# Patient Record
Sex: FEMALE | Race: ASIAN | NOT HISPANIC OR LATINO | URBAN - METROPOLITAN AREA
[De-identification: names, ages, dates, MRNs, and addresses within clinical notes are randomized per-mention and may not be internally consistent; named-entity substitution may affect disease eponyms.]

---

## 2024-03-28 ENCOUNTER — EMERGENCY (EMERGENCY)
Facility: HOSPITAL | Age: 34
LOS: 0 days | Discharge: LEFT BEFORE DISCHARGE | End: 2024-03-28
Attending: STUDENT IN AN ORGANIZED HEALTH CARE EDUCATION/TRAINING PROGRAM
Payer: SELF-PAY

## 2024-03-28 VITALS
DIASTOLIC BLOOD PRESSURE: 91 MMHG | RESPIRATION RATE: 18 BRPM | TEMPERATURE: 98 F | WEIGHT: 167.77 LBS | OXYGEN SATURATION: 99 % | SYSTOLIC BLOOD PRESSURE: 142 MMHG | HEIGHT: 61 IN | HEART RATE: 70 BPM

## 2024-03-28 DIAGNOSIS — N93.9 ABNORMAL UTERINE AND VAGINAL BLEEDING, UNSPECIFIED: ICD-10-CM

## 2024-03-28 DIAGNOSIS — Z53.21 PROCEDURE AND TREATMENT NOT CARRIED OUT DUE TO PATIENT LEAVING PRIOR TO BEING SEEN BY HEALTH CARE PROVIDER: ICD-10-CM

## 2024-03-28 DIAGNOSIS — R10.32 LEFT LOWER QUADRANT PAIN: ICD-10-CM

## 2024-03-28 DIAGNOSIS — Z3A.01 LESS THAN 8 WEEKS GESTATION OF PREGNANCY: ICD-10-CM

## 2024-03-28 DIAGNOSIS — O99.891 OTHER SPECIFIED DISEASES AND CONDITIONS COMPLICATING PREGNANCY: ICD-10-CM

## 2024-03-28 DIAGNOSIS — O26.851 SPOTTING COMPLICATING PREGNANCY, FIRST TRIMESTER: ICD-10-CM

## 2024-03-28 PROCEDURE — 99285 EMERGENCY DEPT VISIT HI MDM: CPT

## 2024-03-28 RX ORDER — ACETAMINOPHEN 500 MG
1000 TABLET ORAL ONCE
Refills: 0 | Status: DISCONTINUED | OUTPATIENT
Start: 2024-03-28 | End: 2024-03-28

## 2024-03-28 RX ORDER — METOCLOPRAMIDE HCL 10 MG
10 TABLET ORAL ONCE
Refills: 0 | Status: DISCONTINUED | OUTPATIENT
Start: 2024-03-28 | End: 2024-03-28

## 2024-03-28 NOTE — ED PROVIDER NOTE - PROGRESS NOTE DETAILS
After hearing it may take several hours for results, patient decided she did not want to wait and eloped without any testing being performed.

## 2024-03-28 NOTE — ED ADULT NURSE NOTE - NSFALLUNIVINTERV_ED_ALL_ED
Bed/Stretcher in lowest position, wheels locked, appropriate side rails in place/Call bell, personal items and telephone in reach/Instruct patient to call for assistance before getting out of bed/chair/stretcher/Non-slip footwear applied when patient is off stretcher/[facility] to call system/Physically safe environment - no spills, clutter or unnecessary equipment/Purposeful proactive rounding/Room/bathroom lighting operational, light cord in reach

## 2024-03-28 NOTE — ED ADULT TRIAGE NOTE - CHIEF COMPLAINT QUOTE
pt here for vaginal spotting since last night.  pt recently had positive urine preg test x2 weeks ago.  first pregnancy.  Pt has been c/o of painful urination and abd pain x5 days.  no pmhx, nkda.

## 2024-03-28 NOTE — ED ADULT NURSE NOTE - NS ED NURSE ELOPE COMMENTS
Pt said they could not wait for tests, they had to go to work in AM. Pt AAOx3, ambulates with steady gait

## 2024-03-28 NOTE — ED PROVIDER NOTE - CLINICAL SUMMARY MEDICAL DECISION MAKING FREE TEXT BOX
32yo female with no pmh presenting with vaginal spotting in pregnancy.  Had + preg test 2 weeks ago.  No pain, fever, n/v/d, pshx.  This is first pregnancy.  Has not been evaluated for it with sonogram yet.  LMP in feb.  Patient noted pain to LLQ and L flank.  No hx stones.  Notes dysuria.  Will get labs with hcg, urine, sono eval stone, iup.  Dispo per results.

## 2024-03-28 NOTE — ED PROVIDER NOTE - PHYSICAL EXAMINATION
General appearance: Nontoxic appearing, conversant, afebrile    Eyes: anicteric sclerae, NUVIA, EOMI   HENT: Atraumatic; oropharynx clear, MMM and no ulcerations, no pharyngeal erythema or exudate   Neck: Trachea midline; Full range of motion, supple   Pulm: CTA bl, normal respiratory effort and no intercostal retractions, normal work of breathing   CV: RRR, No murmurs, rubs, or gallops   Abdomen: Soft, non-tender, non-distended; no guarding or rebound   Extremities: No peripheral edema, no gross deformities, FROM x4   Skin: Dry, normal temperature, turgor and texture; no rash   Psych: Appropriate affect, cooperative

## 2024-03-28 NOTE — ED ADULT NURSE NOTE - OBJECTIVE STATEMENT
AAox3 pt c/o vaginal spotting starting last night. Pt also c/o of painful urination and abd pain x5 days. pt reports recently having positive urine preg test x2 weeks ago.  first pregnancy.    no pmhx, nkda.

## 2025-03-13 NOTE — ED PROVIDER NOTE - CARE PLAN
This provider is located at [facility], [address] [place] [address].The Patient is seen remotely at home, [address], [place] [address] via my chart.  Patient is being seen via telehealth and confirm that they are in a secure environment for this session. The patient's condition being diagnosed/treated is appropriate for telemedicine. The provider identified himself/herself: herself as well as her credentials.   The patient gave consent to be seen remotely, and when consent is given they understand that the consent allows for patient identifiable information to be sent to a third party as needed.   They may refuse to be seen remotely at any time. The electronic data is encrypted and password protected, and the patient has been advised of the potential risks to privacy not withstanding such measures.    You have chosen to receive care through a telehealth visit.  Do you consent to use a video/audio connection for your medical care today? Yes. Patient verified Name, , and address.       Chief Complaint  Depression and anxiety    Subjective    Davidyancy Downs presents to [facility] for medication management.    History of Present Illness  Patient presents today reporting that she is doing good.  She notes that her sleep has been better as she is sleeping 10 hours and not feeling as tired.  Patient states that she has not noticed any major depressive symptoms just mostly stress dealing with her sister.  She states her sister is schizophrenic and not on medication and recently got arrested as she is trying to self medicate and disorderly conduct.  She notes that she is trying to use [place] law so that has been stressful.  States her appetite has been down.  Notes her anxiety has been good and normal other than dealing with her sister issues.  Reports therapy is going well.  Denies any side effects with the medicine.  Patient states  she has had 2 nightmares and none since then.  Reports that her neurologist may be referring her to  for other treatment options since the previous medications have not been working.  Patient denies any SI/HI/AH/VH.  Patient notes that she has been getting outside more and walking with her dogs.      Objective   Vital Signs:   There were no vitals taken for this visit.  Due to the remote nature of this encounter (virtual encounter), vitals were unable to be obtained.  Height stated at 61.5 inches.  Weight stated at 194 pounds.      PHQ-9 Score:   PHQ-9 Total Score:(Patient-Rptd) 7     PHQ-9 Depression Screening  Little interest or pleasure in doing things? (Patient-Rptd) Several days   Feeling down, depressed, or hopeless? (Patient-Rptd) Several days   PHQ-2 Total Score (Patient-Rptd) 2   Trouble falling or staying asleep, or sleeping too much? (Patient-Rptd) Several days   Feeling tired or having little energy? (Patient-Rptd) Over half   Poor appetite or overeating? (Patient-Rptd) Over half   Feeling bad about yourself - or that you are a failure or have let yourself or your family down? (Patient-Rptd) Not at all   Trouble concentrating on things, such as reading the newspaper or watching television? (Patient-Rptd) Not at all   Moving or speaking so slowly that other people could have noticed? Or the opposite - being so fidgety or restless that you have been moving around a lot more than usual? (Patient-Rptd) Not at all   Thoughts that you would be better off dead, or of hurting yourself in some way? (Patient-Rptd) Not at all   PHQ-9 Total Score (Patient-Rptd) 7   If you checked off any problems, how difficult have these problems made it for you to do your work, take care of things at home, or get along with other people? (Patient-Rptd) Somewhat difficult         PHQ-9 Total Score: (Patient-Rptd) 7     SAVITA-7  Feeling nervous, anxious or on edge: (Patient-Rptd) Several days  Not being able to stop or control  worrying: (Patient-Rptd) Not at all  Worrying too much about different things: (Patient-Rptd) Several days  Trouble Relaxing: (Patient-Rptd) Several days  Being so restless that it is hard to sit still: (Patient-Rptd) Several days  Feeling afraid as if something awful might happen: (Patient-Rptd) Not at all  Becoming easily annoyed or irritable: (Patient-Rptd) More than half the days  SAVITA 7 Total Score: (Patient-Rptd) 6  If you checked any problems, how difficult have these problems made it for you to do your work, take care of things at home, or get along with other people: (Patient-Rptd) Somewhat difficult      Patient screened positive for depression based on a PHQ-9 score of 7 on 3/12/2025. Follow-up recommendations include: Prescribed antidepressant medication treatment.        Mental Status Exam:   Hygiene:   good  Cooperation:  Cooperative  Eye Contact:  Good  Psychomotor Behavior:  Restless  Affect:  Appropriate  Mood: normal and anxious  Speech:  Normal  Thought Process:  Goal directed  Thought Content:  Normal  Suicidal:  None  Homicidal:  None  Hallucinations:  None  Delusion:  None  Memory:  Intact  Orientation:  Person, Place, Time, and Situation  Reliability:  good  Insight:  Good  Judgement:  Good  Impulse Control:  Good  Physical/Medical Issues:  Yes see medical hx      Current Medications:   Current Outpatient Medications   Medication Sig Dispense Refill    Cariprazine HCl (Vraylar) 3 MG capsule capsule Take 1 capsule by mouth Daily. 30 capsule 2    Albuterol-Budesonide 90-80 MCG/ACT aerosol Inhale 2 puffs 6 (Six) Times a Day. 3 month supply is ok if insurance will allow 10.7 g 5    atorvastatin (LIPITOR) 20 MG tablet Take 1 tablet by mouth Daily. 90 tablet 1    busPIRone (BUSPAR) 10 MG tablet Take 1 tablet by mouth 3 (Three) Times a Day. 90 tablet 2    famotidine (PEPCID) 20 MG tablet TAKE 1 TABLET BY MOUTH TWICE DAILY 180 tablet 1    FLUoxetine (PROzac) 20 MG capsule TAKE 1 CAPSULE DAILY WITH  1 FLUOXTINE 40 CAOSULE 90 capsule 0    FLUoxetine (PROzac) 40 MG capsule Take 1 capsule by mouth Daily. Take with 20mg capsule. 90 capsule 0    fluticasone (FLONASE) 50 MCG/ACT nasal spray SHAKE LIQUID AND USE 2 SPRAYS IN EACH NOSTRIL DAILY AS DIRECTED 16 g 5    hydrOXYzine (ATARAX) 25 MG tablet Take 1-2 tablets by mouth At Night As Needed for Anxiety (sleep). 60 tablet 2    levothyroxine (SYNTHROID, LEVOTHROID) 150 MCG tablet TAKE 1 TABLET BY MOUTH DAILY 90 tablet 0    lisinopril-hydrochlorothiazide (PRINZIDE,ZESTORETIC) 10-12.5 MG per tablet TAKE 1 TABLET BY MOUTH DAILY 90 tablet 0    prazosin (Minipress) 1 MG capsule Take 1 capsule by mouth Every Night. 90 capsule 0    primidone (MYSOLINE) 50 MG tablet Take 1 tablet by mouth 3 (Three) Times a Day. 90 tablet 3    Ventolin  (90 Base) MCG/ACT inhaler INHALE 2 PUFFS BY MOUTH EVERY 4 HOURS AS NEEDED FOR WHEEZING 18 g 2    vitamin D (ERGOCALCIFEROL) 1.25 MG (52221 UT) capsule capsule Take 1 capsule by mouth 1 (One) Time Per Week. Indications: Vitamin D Deficiency 8 capsule 1     No current facility-administered medications for this visit.       Physical Exam  Nursing note reviewed.   Constitutional:       Appearance: Normal appearance.   Neurological:      Mental Status: She is alert.   Psychiatric:         Attention and Perception: Attention and perception normal.         Mood and Affect: Mood and affect normal. Mood is anxious. Mood is not depressed. Affect is not flat.         Speech: Speech normal.         Behavior: Behavior is cooperative.         Thought Content: Thought content normal.         Cognition and Memory: Cognition and memory normal.         Judgment: Judgment normal.        Result Review :     The following data was reviewed by: ERIK Smith on 10/29/2024:  Common labs          7/17/2024    08:49 9/12/2024    08:47   Common Labs   Glucose 97  94    BUN 11  12    Creatinine 0.77  0.82    Sodium 140  138    Potassium 4.1  3.8    Chloride  103  102    Calcium 10.0  9.6    Albumin 4.4  4.5    Total Bilirubin 0.5  0.4    Alkaline Phosphatase 175  100    AST (SGOT) 15  14    ALT (SGPT) 23  5    WBC 8.79     Hemoglobin 12.0     Hematocrit 38.0     Platelets 516     Total Cholesterol 139     Triglycerides 74     HDL Cholesterol 55     LDL Cholesterol  69       CMP          7/17/2024    08:49 9/12/2024    08:47   CMP   Glucose 97  94    BUN 11  12    Creatinine 0.77  0.82    EGFR 95.9  88.9    Sodium 140  138    Potassium 4.1  3.8    Chloride 103  102    Calcium 10.0  9.6    Total Protein 7.3  7.0    Albumin 4.4  4.5    Globulin 2.9  2.5    Total Bilirubin 0.5  0.4    Alkaline Phosphatase 175  100    AST (SGOT) 15  14    ALT (SGPT) 23  5    Albumin/Globulin Ratio 1.5  1.8    BUN/Creatinine Ratio 14.3  14.6    Anion Gap 12.0  13.0      CBC          7/17/2024    08:49   CBC   WBC 8.79    RBC 4.91    Hemoglobin 12.0    Hematocrit 38.0    MCV 77.4    MCH 24.4    MCHC 31.6    RDW 13.8    Platelets 516      CBC w/diff          1/17/2024    09:23 7/17/2024    08:49   CBC w/Diff   WBC 7.05  8.79    RBC 5.12  4.91    Hemoglobin 12.4  12.0    Hematocrit 38.9  38.0    MCV 76.0  77.4    MCH 24.2  24.4    MCHC 31.9  31.6    RDW 13.8  13.8    Platelets 530  516    Neutrophil Rel % 67.9  66.2    Immature Granulocyte Rel % 0.3  0.3    Lymphocyte Rel % 22.0  23.0    Monocyte Rel % 7.1  8.5    Eosinophil Rel % 1.7  1.1    Basophil Rel % 1.0  0.9      Lipid Panel          7/17/2024    08:49   Lipid Panel   Total Cholesterol 139    Triglycerides 74    HDL Cholesterol 55    VLDL Cholesterol 15    LDL Cholesterol  69    LDL/HDL Ratio 1.26      TSH          7/17/2024    08:49 9/12/2024    08:47 2/25/2025    08:47   TSH   TSH 0.019  1.000  2.230      Electrolytes          7/17/2024    08:49 9/12/2024    08:47   Electrolytes   Sodium 140  138    Potassium 4.1  3.8    Chloride 103  102    Calcium 10.0  9.6      Renal Profile          7/17/2024    08:49 9/12/2024    08:47   Renal  Profile   BUN 11  12    Creatinine 0.77  0.82      BMP          7/17/2024    08:49 9/12/2024    08:47   BMP   BUN 11  12    Creatinine 0.77  0.82    Sodium 140  138    Potassium 4.1  3.8    Chloride 103  102    CO2 25.0  23.0    Calcium 10.0  9.6            Data reviewed : PCP and therapy notes         Assessment and Plan    Problem List Items Addressed This Visit       SAVITA (generalized anxiety disorder) - Primary    Relevant Medications    Cariprazine HCl (Vraylar) 3 MG capsule capsule     Other Visit Diagnoses         Major depressive disorder, recurrent episode, moderate  (Chronic)       Relevant Medications    Cariprazine HCl (Vraylar) 3 MG capsule capsule      Post traumatic stress disorder (PTSD)        Relevant Medications    Cariprazine HCl (Vraylar) 3 MG capsule capsule      Nightmares        Relevant Medications    Cariprazine HCl (Vraylar) 3 MG capsule capsule      Sleep difficulties          Vitamin D insufficiency                      Encounter Diagnoses   Name Primary?    Major depressive disorder, recurrent episode, moderate     SAVITA (generalized anxiety disorder) Yes    Post traumatic stress disorder (PTSD)     Nightmares     Sleep difficulties     Vitamin D insufficiency           I have reviewed the patient and the results are consistent with the previously documented exam by myself.  I have reviewed the following portions of the patient's ROS and PFSH and confirmed them as accurate.  The HPI has been updated, chief of complaint, ROS and subjective have been reviewed and up-to-date.  The following portions of the patient's notes were reviewed, confirmed and/or updated this visit as appropriate: History of present illness/Interval history, physical examination, assessment and plan, allergies, current medications, past family medical history, past medical history, past social history, past surgical history and problem list.        TREATMENT PLAN/GOALS: Continue supportive psychotherapy efforts and  medications as indicated. Treatment and medication options discussed during today's visit. Patient ackowledged and verbally consented to continue with current treatment plan and was educated on the importance of compliance with treatment and follow-up appointments.    MEDICATION ISSUES:  We discussed risks, benefits, and side effects of the above medications and the patient was agreeable with the plan. Patient was educated on the importance of compliance with treatment and follow-up appointments.  Patient is agreeable to call the office with any worsening of symptoms or onset of side effects. Patient is agreeable to call 911 or go to the nearest ER should he/she begin having SI/HI.     -Continue Prozac 60 mg daily for depression and anxiety.  -Continue BuSpar 10 mg 3 times a day for anxiety however highly encouraged patient if she had any worsening in anxiety after her neurology appointment that did not improve we could increase to 15 mg 3 times daily and to contact the clinic patient verbalized understanding.  -Continue Vraylar  3 mg daily as adjunct for depression.    -Continue hydroxyzine 25 to 50 mg at night as needed for sleep.  -Continue therapy.  -Continue Vitamin D 50,000 units weekly.      Counseled patient regarding multimodal approach with healthy nutrition, healthy sleep, regular physical activity, social activities, counseling, and medications.      Coping skills reviewed and encouraged positive framing of thoughts     Assisted patient in processing above session content; acknowledged and normalized patient’s thoughts, feelings, and concerns.  Applied  positive coping skills and behavior management in session.  Allowed patient to freely discuss issues without interruption or judgment. Provided safe, confidential environment to facilitate the development of positive therapeutic relationship and encourage open, honest communication. Assisted patient in identifying risk factors which would indicate the  need for higher level of care including thoughts to harm self or others and/or self-harming behavior and encouraged patient to contact this office, call 911, or present to the nearest emergency room should any of these events occur. Discussed crisis intervention services and means to access.     MEDS ORDERED DURING VISIT:  New Medications Ordered This Visit   Medications    Cariprazine HCl (Vraylar) 3 MG capsule capsule     Sig: Take 1 capsule by mouth Daily.     Dispense:  30 capsule     Refill:  2           Follow Up   Return in about 8 weeks (around 5/8/2025), or if symptoms worsen or fail to improve, for Recheck.    Patient was given instructions and counseling regarding her condition or for health maintenance advice. Please see specific information pulled into the AVS if appropriate.     Some of the data in this electronic note has been brought forward from a previous encounter, any necessary changes have been made, it has been reviewed by this APRN, and it is accurate.      This document has been electronically signed by ERIK Smith  March 13, 2025 08:57 EDT    Part of this note may be an electronic transcription/translation of spoken language to printed text using the Dragon Dictation System.   Principal Discharge DX:	First trimester bleeding